# Patient Record
Sex: MALE | NOT HISPANIC OR LATINO | Employment: OTHER | URBAN - METROPOLITAN AREA
[De-identification: names, ages, dates, MRNs, and addresses within clinical notes are randomized per-mention and may not be internally consistent; named-entity substitution may affect disease eponyms.]

---

## 2018-08-08 ENCOUNTER — HOSPITAL ENCOUNTER (EMERGENCY)
Facility: HOSPITAL | Age: 53
Discharge: HOME | End: 2018-08-08
Attending: EMERGENCY MEDICINE
Payer: OTHER GOVERNMENT

## 2018-08-08 VITALS
WEIGHT: 184.75 LBS | TEMPERATURE: 98 F | HEART RATE: 73 BPM | SYSTOLIC BLOOD PRESSURE: 147 MMHG | BODY MASS INDEX: 27.28 KG/M2 | OXYGEN SATURATION: 98 % | RESPIRATION RATE: 16 BRPM | DIASTOLIC BLOOD PRESSURE: 80 MMHG

## 2018-08-08 DIAGNOSIS — R74.8 ELEVATED LIVER ENZYMES: ICD-10-CM

## 2018-08-08 DIAGNOSIS — K80.20 CALCULUS OF GALLBLADDER WITHOUT CHOLECYSTITIS WITHOUT OBSTRUCTION: ICD-10-CM

## 2018-08-08 DIAGNOSIS — R10.10 PAIN OF UPPER ABDOMEN: Primary | ICD-10-CM

## 2018-08-08 PROBLEM — R10.84 GENERALIZED ABDOMINAL PAIN: Status: ACTIVE | Noted: 2018-08-08

## 2018-08-08 PROBLEM — R14.0 ABDOMINAL DISTENTION: Status: ACTIVE | Noted: 2018-08-08

## 2018-08-08 LAB
ALBUMIN SERPL BCP-MCNC: 4.2 G/DL
ALP SERPL-CCNC: 67 U/L
ALT SERPL W/O P-5'-P-CCNC: 56 U/L
ANION GAP SERPL CALC-SCNC: 12 MMOL/L
AST SERPL-CCNC: 115 U/L
BASOPHILS # BLD AUTO: 0.03 K/UL
BASOPHILS NFR BLD: 0.7 %
BILIRUB SERPL-MCNC: 1.9 MG/DL
BILIRUB UR QL STRIP: ABNORMAL
BUN SERPL-MCNC: 19 MG/DL
CALCIUM SERPL-MCNC: 10.2 MG/DL
CHLORIDE SERPL-SCNC: 105 MMOL/L
CLARITY UR: CLEAR
CO2 SERPL-SCNC: 22 MMOL/L
COLOR UR: YELLOW
CREAT SERPL-MCNC: 1.1 MG/DL
DIFFERENTIAL METHOD: ABNORMAL
EOSINOPHIL # BLD AUTO: 0.1 K/UL
EOSINOPHIL NFR BLD: 1.6 %
ERYTHROCYTE [DISTWIDTH] IN BLOOD BY AUTOMATED COUNT: 13.5 %
EST. GFR  (AFRICAN AMERICAN): >60 ML/MIN/1.73 M^2
EST. GFR  (NON AFRICAN AMERICAN): >60 ML/MIN/1.73 M^2
GLUCOSE SERPL-MCNC: 104 MG/DL
GLUCOSE UR QL STRIP: NEGATIVE
HCT VFR BLD AUTO: 37.2 %
HGB BLD-MCNC: 12.9 G/DL
HGB UR QL STRIP: NEGATIVE
KETONES UR QL STRIP: ABNORMAL
LEUKOCYTE ESTERASE UR QL STRIP: NEGATIVE
LIPASE SERPL-CCNC: 67 U/L
LYMPHOCYTES # BLD AUTO: 1 K/UL
LYMPHOCYTES NFR BLD: 23 %
MCH RBC QN AUTO: 33.6 PG
MCHC RBC AUTO-ENTMCNC: 34.7 G/DL
MCV RBC AUTO: 97 FL
MONOCYTES # BLD AUTO: 0.6 K/UL
MONOCYTES NFR BLD: 14.2 %
NEUTROPHILS # BLD AUTO: 2.7 K/UL
NEUTROPHILS NFR BLD: 60.5 %
NITRITE UR QL STRIP: NEGATIVE
PH UR STRIP: 7 [PH] (ref 5–8)
PLATELET # BLD AUTO: 86 K/UL
PMV BLD AUTO: 9.5 FL
POTASSIUM SERPL-SCNC: 4.2 MMOL/L
PROT SERPL-MCNC: 8.4 G/DL
PROT UR QL STRIP: NEGATIVE
RBC # BLD AUTO: 3.84 M/UL
SODIUM SERPL-SCNC: 139 MMOL/L
SP GR UR STRIP: 1.01 (ref 1–1.03)
URN SPEC COLLECT METH UR: ABNORMAL
UROBILINOGEN UR STRIP-ACNC: ABNORMAL EU/DL
WBC # BLD AUTO: 4.44 K/UL

## 2018-08-08 PROCEDURE — 96375 TX/PRO/DX INJ NEW DRUG ADDON: CPT

## 2018-08-08 PROCEDURE — 99284 EMERGENCY DEPT VISIT MOD MDM: CPT | Mod: 25

## 2018-08-08 PROCEDURE — 25500020 PHARM REV CODE 255: Performed by: EMERGENCY MEDICINE

## 2018-08-08 PROCEDURE — 96361 HYDRATE IV INFUSION ADD-ON: CPT

## 2018-08-08 PROCEDURE — 85025 COMPLETE CBC W/AUTO DIFF WBC: CPT

## 2018-08-08 PROCEDURE — 25000003 PHARM REV CODE 250: Performed by: EMERGENCY MEDICINE

## 2018-08-08 PROCEDURE — 81003 URINALYSIS AUTO W/O SCOPE: CPT

## 2018-08-08 PROCEDURE — 93010 ELECTROCARDIOGRAM REPORT: CPT | Mod: ,,, | Performed by: INTERNAL MEDICINE

## 2018-08-08 PROCEDURE — C9113 INJ PANTOPRAZOLE SODIUM, VIA: HCPCS | Performed by: EMERGENCY MEDICINE

## 2018-08-08 PROCEDURE — 63600175 PHARM REV CODE 636 W HCPCS: Performed by: EMERGENCY MEDICINE

## 2018-08-08 PROCEDURE — 80053 COMPREHEN METABOLIC PANEL: CPT

## 2018-08-08 PROCEDURE — 86677 HELICOBACTER PYLORI ANTIBODY: CPT

## 2018-08-08 PROCEDURE — 83690 ASSAY OF LIPASE: CPT

## 2018-08-08 PROCEDURE — 96374 THER/PROPH/DIAG INJ IV PUSH: CPT | Mod: 59

## 2018-08-08 RX ORDER — ONDANSETRON 4 MG/1
4 TABLET, ORALLY DISINTEGRATING ORAL
Status: COMPLETED | OUTPATIENT
Start: 2018-08-08 | End: 2018-08-08

## 2018-08-08 RX ORDER — NAPROXEN 500 MG/1
500 TABLET ORAL 2 TIMES DAILY
COMMUNITY
End: 2018-08-09 | Stop reason: DRUGHIGH

## 2018-08-08 RX ORDER — MORPHINE SULFATE 4 MG/ML
4 INJECTION, SOLUTION INTRAMUSCULAR; INTRAVENOUS
Status: COMPLETED | OUTPATIENT
Start: 2018-08-08 | End: 2018-08-08

## 2018-08-08 RX ORDER — HYDROCODONE BITARTRATE AND ACETAMINOPHEN 5; 325 MG/1; MG/1
1 TABLET ORAL
Status: COMPLETED | OUTPATIENT
Start: 2018-08-08 | End: 2018-08-08

## 2018-08-08 RX ORDER — ONDANSETRON 2 MG/ML
4 INJECTION INTRAMUSCULAR; INTRAVENOUS
Status: COMPLETED | OUTPATIENT
Start: 2018-08-08 | End: 2018-08-08

## 2018-08-08 RX ORDER — ONDANSETRON 4 MG/1
4 TABLET, FILM COATED ORAL EVERY 6 HOURS
Qty: 12 TABLET | Refills: 0 | Status: SHIPPED | OUTPATIENT
Start: 2018-08-08

## 2018-08-08 RX ADMIN — DICYCLOMINE HYDROCHLORIDE 50 ML: 10 SOLUTION ORAL at 03:08

## 2018-08-08 RX ADMIN — MORPHINE SULFATE 4 MG: 4 INJECTION INTRAVENOUS at 11:08

## 2018-08-08 RX ADMIN — ONDANSETRON 4 MG: 2 INJECTION, SOLUTION INTRAMUSCULAR; INTRAVENOUS at 11:08

## 2018-08-08 RX ADMIN — IOHEXOL 30 ML: 350 INJECTION, SOLUTION INTRAVENOUS at 11:08

## 2018-08-08 RX ADMIN — HYDROCODONE BITARTRATE AND ACETAMINOPHEN 1 TABLET: 5; 325 TABLET ORAL at 05:08

## 2018-08-08 RX ADMIN — ONDANSETRON 4 MG: 4 TABLET, ORALLY DISINTEGRATING ORAL at 05:08

## 2018-08-08 RX ADMIN — DEXTROSE 40 MG: 50 INJECTION, SOLUTION INTRAVENOUS at 03:08

## 2018-08-08 RX ADMIN — SODIUM CHLORIDE 1000 ML: 0.9 INJECTION, SOLUTION INTRAVENOUS at 11:08

## 2018-08-08 RX ADMIN — IOHEXOL 75 ML: 350 INJECTION, SOLUTION INTRAVENOUS at 01:08

## 2018-08-08 NOTE — DISCHARGE INSTRUCTIONS
You can also try FD Guard over the counter for your abdominal pain.  Take 1 capsule daily.  Return for fever, vomiting, worsening pain or for any concerns at all.

## 2018-08-08 NOTE — HPI
Patient presents to the emergency room with abdominal pain is been going on since January.  He describes the pain as a crampy pain associated with upper mid and lower abdominal discomfort.  The pain can occur after eating.    The patient states that whenever he goes to sleep for the night or takes a nap he wakes up with abdominal distention and bloating.  If he gets up and moves around it gets better.  There is pain associated with this.    The pain radiates to the bilateral flanks.  He does report nausea and vomiting.  There is no constipation The pain can occur postprandially.    The patient has had imaging studies done in the past that did not show cholelithiasis however [facility] and his CT scan shows a 7 mm gallstone.    His liver function tests were slightly elevated however they are markedly improved to the liver function test approximately a year ago.  At that time he was drinking 10-12 beers a day.  He has cut down to 3 or 4 on the weekends.  He has had less over the last 2 weeks due to the discomfort.    The patient states that he drank the bowel prep he was given 1st colonoscopy and this did not improve symptoms    [facility] and he states that he is scheduled to undergo a colonoscopy and upper endoscopy next week.  He came to the hospital because of the pain.  The patient is very frustrated as to the etiology of the pain. He would like something to be done to figure out why the pain is occurring and its etiology.  He says that if his sent home he will just come back tomorrow because of the pain.

## 2018-08-08 NOTE — HOSPITAL COURSE
Surgery was called to see the patient secondary to his elevated liver function test and the fact that a gallstone of 7 mm size was seen on the CT scan.    In review of the Care everywhere section the patient's liver function tests were significantly more elevated approximately a year ago.  A liver biopsy was consistent with fatty infiltration of the liver.

## 2018-08-08 NOTE — CONSULTS
[facility] -   General Surgery  Consult Note    Patient Name: Georges Alatorre  MRN: [id number]  Code Status: No Order  Admission Date: 8/8/2018  Hospital Length of Stay: 0 days  Attending Physician: Huma Zafar Do, MD  Primary Care Provider: Yuliana Monge MD    Patient information was obtained from parent, past medical records and ER records.     Consults  Subjective:     Principal Problem: <principal problem not specified>    History of Present Illness: Patient presents to the emergency room with abdominal pain is been going on since January.  He describes the pain as a crampy pain associated with upper mid and lower abdominal discomfort.  The pain can occur after eating.    The patient states that whenever he goes to sleep for the night or takes a nap he wakes up with abdominal distention and bloating.  If he gets up and moves around it gets better.  There is pain associated with this.    The pain radiates to the bilateral flanks.  He does report nausea and vomiting.  There is no constipation The pain can occur postprandially.    The patient has had imaging studies done in the past that did not show cholelithiasis however [facility] and his CT scan shows a 7 mm gallstone.    His liver function tests were slightly elevated however they are markedly improved to the liver function test approximately a year ago.  At that time he was drinking 10-12 beers a day.  He has cut down to 3 or 4 on the weekends.  He has had less over the last 2 weeks due to the discomfort.    The patient states that he drank the bowel prep he was given 1st colonoscopy and this did not improve symptoms    [facility] and he states that he is scheduled to undergo a colonoscopy and upper endoscopy next week.  He came to the hospital because of the pain.  The patient is very frustrated as to the etiology of the pain. He would like something to be done to figure out why the pain is  occurring and its etiology.  He says that if his sent home he will just come back tomorrow because of the pain.    No current facility-administered medications on file prior to encounter.      Current Outpatient Prescriptions on File Prior to Encounter   Medication Sig    omeprazole (PRILOSEC) 40 MG capsule Take 40 mg by mouth once daily.    hydrocodone-acetaminophen 7.5-325mg (NORCO) 7.5-325 mg per tablet Take 1 tablet by mouth every 6 (six) hours as needed for Pain.    lorazepam (ATIVAN) 1 MG tablet Take 0.5 tablets (0.5 mg total) by mouth every 6 (six) hours as needed for Anxiety.    valsartan (DIOVAN) 160 MG tablet Take 160 mg by mouth once daily.       Review of patient's allergies indicates:  No Known Allergies    Past Medical History:   Diagnosis Date    Alcohol dependence     Chronic pain     DDD (degenerative disc disease)     Depression     Elevated LFTs     HTN (hypertension)     Hyperlipidemia     PTSD (post-traumatic stress disorder)      Past Surgical History:   Procedure Laterality Date    APPENDECTOMY      EAR RECONSTRUCTION Right     HEMORRHOID SURGERY      HERNIA REPAIR Right     LEG SURGERY Left     fx repair     Family History     None        Social History Main Topics    Smoking status: Former Smoker    Smokeless tobacco: Not on file    Alcohol use Yes      Comment: He was drinking 12 beers/night, but now down to 2 during the week. Mostly weekend use at this tiem.     Drug use: No    Sexual activity: Not on file     Review of Systems   Constitutional: Negative.    HENT: Negative.    Eyes: Negative.    Respiratory: Negative.    Cardiovascular: Negative.    Gastrointestinal: Positive for abdominal distention and abdominal pain.   Endocrine: Negative.    Genitourinary: Negative.    Musculoskeletal: Negative.    Skin: Negative.    Allergic/Immunologic: Negative.    Neurological: Negative.    Hematological: Negative.    Psychiatric/Behavioral: Negative.      Objective:     Vital  Signs (Most Recent):  Temp: 98.3 °F (36.8 °C) (08/08/18 1042)  Pulse: 71 (08/08/18 1347)  Resp: 20 (08/08/18 1345)  BP: (!) 165/86 (08/08/18 1347)  SpO2: 99 % (08/08/18 1347) Vital Signs (24h Range):  Temp:  [98.3 °F (36.8 °C)] 98.3 °F (36.8 °C)  Pulse:  [68-89] 71  Resp:  [12-20] 20  SpO2:  [98 %-100 %] 99 %  BP: (141-165)/(82-92) 165/86     Weight: 83.8 kg (184 lb 11.9 oz)  Body mass index is 27.28 kg/m².    Physical Exam   Constitutional: He is oriented to person, place, and time. He appears well-developed and well-nourished. No distress.   HENT:   Head: Normocephalic and atraumatic.   Eyes: Pupils are equal, round, and reactive to light. No scleral icterus.   Neck: Normal range of motion. Neck supple. No JVD present. No tracheal deviation present. No thyromegaly present.   Cardiovascular: Normal rate, regular rhythm and normal heart sounds.    Pulmonary/Chest: Effort normal and breath sounds normal.   Abdominal: Soft. Bowel sounds are normal. He exhibits no distension and no mass. There is no tenderness (Mild upper abdomen and epigastric tenderness). There is no rebound and no guarding.   Musculoskeletal: Normal range of motion.   Lymphadenopathy:     He has no cervical adenopathy.   Neurological: He is alert and oriented to person, place, and time.   Skin: Skin is warm and dry.   Psychiatric: He has a normal mood and affect. His behavior is normal. Judgment and thought content normal.       Significant Labs:  CBC:   Recent Labs  Lab 08/08/18  1120   WBC 4.44   RBC 3.84*   HGB 12.9*   HCT 37.2*   PLT 86*   MCV 97   MCH 33.6*   MCHC 34.7     BMP:   Recent Labs  Lab 08/08/18  1120         K 4.2      CO2 22*   BUN 19   CREATININE 1.1   CALCIUM 10.2     CMP:   Recent Labs  Lab 08/08/18  1120      CALCIUM 10.2   ALBUMIN 4.2   PROT 8.4      K 4.2   CO2 22*      BUN 19   CREATININE 1.1   ALKPHOS 67   ALT 56*   *   BILITOT 1.9*     LFTs:   Recent Labs  Lab 08/08/18  1120    ALT 56*   *   ALKPHOS 67   BILITOT 1.9*   PROT 8.4   ALBUMIN 4.2     Coagulation: No results for input(s): LABPROT, INR, APTT in the last 168 hours.    Significant Diagnostics:  CT: I have reviewed all pertinent results/findings within the past 24 hours and my personal findings are:        Reading Physician Reading Date Result Priority   KOREY Klein Sr., MD 8/8/2018    Narrative     EXAMINATION:  CT ABDOMEN PELVIS WITH CONTRAST    CLINICAL HISTORY:  Abd pain, fever, abscess suspected;    TECHNIQUE:  Standard abdomen and pelvis CT protocol with oral and IV contrast was performed.  75 mL of Omnipaque 350 contrast material was used for this examination.    FINDINGS:  Finding: The size of the heart is within normal limits.  There is no evidence of an acute pulmonary process.  There are several nodular densities in the lungs that appear to be focal dilatations of the pulmonary vessels.  There is no pneumothorax or pleural effusion.    The liver is enlarged.  It measures 22.3 cm in craniocaudal dimension.  There is a 7 mm stone in the dependent portion of the gallbladder.  The pancreas and adrenals are normal in appearance.  The spleen is enlarged.  It measures 16.0 cm in length.  There is a mild amount of bilateral perinephric stranding. The ureters and the urinary bladder are normal in appearance. The appendix is not visualized.  The rest of the gastrointestinal system is normal in appearance. There is no free fluid within the abdomen or pelvis. There is no pneumoperitoneum.  There was no abscess visualized.      Impression       1. There was no abscess visualized.  2. The liver is enlarged. It measures 22.3 cm in craniocaudal dimension.  3. The spleen is enlarged. It measures 16.0 cm in length.  4. There is a 7 mm stone in the dependent portion of the gallbladder.  5. There is no evidence of an acute pulmonary process. There are several nodular densities in the lungs that appear to be focal dilatations of  the pulmonary vessels.  All CT scans at this facility use dose modulation, iterative reconstruction, and/or weight base dosing when appropriate to reduce radiation dose when appropriate to reduce radiation dose to as low as reasonably achievable.      Electronically signed by: Guilherme Klein MD  Date: 08/08/2018  Time: 14:00         Assessment/Plan:     Elevated LFTs    His liver function tests are elevated but there are dramatically improved from the ones done almost a year ago.  These are likely related to chronic alcohol use          VTE Risk Mitigation     None        Given the constellation of symptoms, they do not seem to fit gallbladder disease, consider Gastroenterology consultation.    Cholecystectomy can be considered after GI evaluation however the patient would need to understand that removed was gallbladder may or may not alleviate his symptoms and complaints    Thank you for your consult. I will follow-up with patient. Please contact us if you have any additional questions.    Pepe Duran MD  General Surgery  [facility]

## 2018-08-08 NOTE — ASSESSMENT & PLAN NOTE
His liver function tests are elevated but there are dramatically improved from the ones done almost a year ago.  These are likely related to chronic alcohol use

## 2018-08-08 NOTE — SUBJECTIVE & OBJECTIVE
No current facility-administered medications on file prior to encounter.      Current Outpatient Prescriptions on File Prior to Encounter   Medication Sig    omeprazole (PRILOSEC) 40 MG capsule Take 40 mg by mouth once daily.    hydrocodone-acetaminophen 7.5-325mg (NORCO) 7.5-325 mg per tablet Take 1 tablet by mouth every 6 (six) hours as needed for Pain.    lorazepam (ATIVAN) 1 MG tablet Take 0.5 tablets (0.5 mg total) by mouth every 6 (six) hours as needed for Anxiety.    valsartan (DIOVAN) 160 MG tablet Take 160 mg by mouth once daily.       Review of patient's allergies indicates:  No Known Allergies    Past Medical History:   Diagnosis Date    Alcohol dependence     Chronic pain     DDD (degenerative disc disease)     Depression     Elevated LFTs     HTN (hypertension)     Hyperlipidemia     PTSD (post-traumatic stress disorder)      Past Surgical History:   Procedure Laterality Date    APPENDECTOMY      EAR RECONSTRUCTION Right     HEMORRHOID SURGERY      HERNIA REPAIR Right     LEG SURGERY Left     fx repair     Family History     None        Social History Main Topics    Smoking status: Former Smoker    Smokeless tobacco: Not on file    Alcohol use Yes      Comment: He was drinking 12 beers/night, but now down to 2 during the week. Mostly weekend use at this tiem.     Drug use: No    Sexual activity: Not on file     Review of Systems   Constitutional: Negative.    HENT: Negative.    Eyes: Negative.    Respiratory: Negative.    Cardiovascular: Negative.    Gastrointestinal: Positive for abdominal distention and abdominal pain.   Endocrine: Negative.    Genitourinary: Negative.    Musculoskeletal: Negative.    Skin: Negative.    Allergic/Immunologic: Negative.    Neurological: Negative.    Hematological: Negative.    Psychiatric/Behavioral: Negative.      Objective:     Vital Signs (Most Recent):  Temp: 98.3 °F (36.8 °C) (08/08/18 1042)  Pulse: 71 (08/08/18 1347)  Resp: 20 (08/08/18  1345)  BP: (!) 165/86 (08/08/18 1347)  SpO2: 99 % (08/08/18 1347) Vital Signs (24h Range):  Temp:  [98.3 °F (36.8 °C)] 98.3 °F (36.8 °C)  Pulse:  [68-89] 71  Resp:  [12-20] 20  SpO2:  [98 %-100 %] 99 %  BP: (141-165)/(82-92) 165/86     Weight: 83.8 kg (184 lb 11.9 oz)  Body mass index is 27.28 kg/m².    Physical Exam   Constitutional: He is oriented to person, place, and time. He appears well-developed and well-nourished. No distress.   HENT:   Head: Normocephalic and atraumatic.   Eyes: Pupils are equal, round, and reactive to light. No scleral icterus.   Neck: Normal range of motion. Neck supple. No JVD present. No tracheal deviation present. No thyromegaly present.   Cardiovascular: Normal rate, regular rhythm and normal heart sounds.    Pulmonary/Chest: Effort normal and breath sounds normal.   Abdominal: Soft. Bowel sounds are normal. He exhibits no distension and no mass. There is no tenderness (Mild upper abdomen and epigastric tenderness). There is no rebound and no guarding.   Musculoskeletal: Normal range of motion.   Lymphadenopathy:     He has no cervical adenopathy.   Neurological: He is alert and oriented to person, place, and time.   Skin: Skin is warm and dry.   Psychiatric: He has a normal mood and affect. His behavior is normal. Judgment and thought content normal.       Significant Labs:  CBC:   Recent Labs  Lab 08/08/18  1120   WBC 4.44   RBC 3.84*   HGB 12.9*   HCT 37.2*   PLT 86*   MCV 97   MCH 33.6*   MCHC 34.7     BMP:   Recent Labs  Lab 08/08/18  1120         K 4.2      CO2 22*   BUN 19   CREATININE 1.1   CALCIUM 10.2     CMP:   Recent Labs  Lab 08/08/18  1120      CALCIUM 10.2   ALBUMIN 4.2   PROT 8.4      K 4.2   CO2 22*      BUN 19   CREATININE 1.1   ALKPHOS 67   ALT 56*   *   BILITOT 1.9*     LFTs:   Recent Labs  Lab 08/08/18  1120   ALT 56*   *   ALKPHOS 67   BILITOT 1.9*   PROT 8.4   ALBUMIN 4.2     Coagulation: No results for  input(s): LABPROT, INR, APTT in the last 168 hours.    Significant Diagnostics:  CT: I have reviewed all pertinent results/findings within the past 24 hours and my personal findings are:        Reading Physician Reading Date Result Priority   KOREY Klein Sr., MD 8/8/2018    Narrative     EXAMINATION:  CT ABDOMEN PELVIS WITH CONTRAST    CLINICAL HISTORY:  Abd pain, fever, abscess suspected;    TECHNIQUE:  Standard abdomen and pelvis CT protocol with oral and IV contrast was performed.  75 mL of Omnipaque 350 contrast material was used for this examination.    FINDINGS:  Finding: The size of the heart is within normal limits.  There is no evidence of an acute pulmonary process.  There are several nodular densities in the lungs that appear to be focal dilatations of the pulmonary vessels.  There is no pneumothorax or pleural effusion.    The liver is enlarged.  It measures 22.3 cm in craniocaudal dimension.  There is a 7 mm stone in the dependent portion of the gallbladder.  The pancreas and adrenals are normal in appearance.  The spleen is enlarged.  It measures 16.0 cm in length.  There is a mild amount of bilateral perinephric stranding. The ureters and the urinary bladder are normal in appearance. The appendix is not visualized.  The rest of the gastrointestinal system is normal in appearance. There is no free fluid within the abdomen or pelvis. There is no pneumoperitoneum.  There was no abscess visualized.      Impression       1. There was no abscess visualized.  2. The liver is enlarged. It measures 22.3 cm in craniocaudal dimension.  3. The spleen is enlarged. It measures 16.0 cm in length.  4. There is a 7 mm stone in the dependent portion of the gallbladder.  5. There is no evidence of an acute pulmonary process. There are several nodular densities in the lungs that appear to be focal dilatations of the pulmonary vessels.  All CT scans at this facility use dose modulation, iterative reconstruction,  and/or weight base dosing when appropriate to reduce radiation dose when appropriate to reduce radiation dose to as low as reasonably achievable.      Electronically signed by: Guilherme Klein MD  Date: 08/08/2018  Time: 14:00

## 2018-08-08 NOTE — ASSESSMENT & PLAN NOTE
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 He has thrombocytopenia which is concerning for liver cirrhosis. Follow up with current providers.

## 2018-08-08 NOTE — ASSESSMENT & PLAN NOTE
The patient's pain is a chronic problem with extensive GI evaluation in the past. Labs, vitals and CT were reviewed without any indication for acute endoscopy. His abdominal exam is also benign. I believe he should keep his currently scheduled scopes next week. He can try a change in PPI from Omeprazole to Protonix. I also recommend a trial of FDgard. He may benefit from SSRI as an outpatient. I offered a clinic appt after his scopes to discuss his results and talk about other treatment options. He declines. This was discussed with Dr. Nova in the ER.

## 2018-08-08 NOTE — ED NOTES
Pt sitting in chair at BS.  States back is hurting too bad to stay in the bed.  Complains of RUQ pain radiating to back.

## 2018-08-08 NOTE — SUBJECTIVE & OBJECTIVE
Past Medical History:   Diagnosis Date    Alcohol dependence     Chronic pain     DDD (degenerative disc disease)     Depression     Elevated LFTs     HTN (hypertension)     Hyperlipidemia     PTSD (post-traumatic stress disorder)        Past Surgical History:   Procedure Laterality Date    APPENDECTOMY      EAR RECONSTRUCTION Right     HEMORRHOID SURGERY      HERNIA REPAIR Right     LEG SURGERY Left     fx repair       Review of patient's allergies indicates:  No Known Allergies  Family History     None        Social History Main Topics    Smoking status: Former Smoker    Smokeless tobacco: Not on file    Alcohol use Yes      Comment: He was drinking 12 beers/night, but now down to 2 during the week. Mostly weekend use at this tiem.     Drug use: No    Sexual activity: Not on file     Review of Systems   Constitutional: Negative for fatigue and fever.   HENT: Negative for hearing loss.    Eyes: Negative for visual disturbance.   Respiratory: Negative for cough and shortness of breath.    Cardiovascular: Negative for chest pain and palpitations.   Gastrointestinal:        As per HPI.   Genitourinary: Negative for difficulty urinating, dysuria, frequency and hematuria.   Musculoskeletal: Negative for arthralgias and back pain.   Skin: Negative for color change and rash.   Neurological: Negative for dizziness, seizures, syncope, weakness, numbness and headaches.   Hematological: Does not bruise/bleed easily.   Psychiatric/Behavioral: The patient is not nervous/anxious.      Objective:     Vital Signs (Most Recent):  Temp: 98.3 °F (36.8 °C) (08/08/18 1042)  Pulse: 71 (08/08/18 1347)  Resp: 20 (08/08/18 1345)  BP: (!) 165/86 (08/08/18 1347)  SpO2: 99 % (08/08/18 1347) Vital Signs (24h Range):  Temp:  [98.3 °F (36.8 °C)] 98.3 °F (36.8 °C)  Pulse:  [68-89] 71  Resp:  [12-20] 20  SpO2:  [98 %-100 %] 99 %  BP: (141-165)/(82-92) 165/86     Weight: 83.8 kg (184 lb 11.9 oz) (08/08/18 1042)  Body mass index is  27.28 kg/m².      Intake/Output Summary (Last 24 hours) at 08/08/18 1628  Last data filed at 08/08/18 1311   Gross per 24 hour   Intake             1000 ml   Output                0 ml   Net             1000 ml       Lines/Drains/Airways     Peripheral Intravenous Line                 Peripheral IV - Single Lumen 08/08/18 1120 Right Forearm less than 1 day                Physical Exam   Constitutional: He is oriented to person, place, and time. He appears well-developed and well-nourished.   HENT:   Head: Normocephalic and atraumatic.   Eyes: EOM are normal.   Neck: Normal range of motion. Neck supple.   Cardiovascular: Normal rate, regular rhythm and normal heart sounds.    No murmur heard.  Pulmonary/Chest: Effort normal and breath sounds normal. No respiratory distress. He has no wheezes.   Abdominal: Soft. Bowel sounds are normal. He exhibits no distension and no mass. There is no hepatomegaly. There is no tenderness.   Musculoskeletal: He exhibits no edema.   Neurological: He is alert and oriented to person, place, and time. No cranial nerve deficit. Gait normal.   Skin: Skin is warm and dry. No rash noted.   Psychiatric: He has a normal mood and affect.       Significant Labs:  CBC:   Recent Labs  Lab 08/08/18  1120   WBC 4.44   HGB 12.9*   HCT 37.2*   PLT 86*     CMP:   Recent Labs  Lab 08/08/18  1120      CALCIUM 10.2   ALBUMIN 4.2   PROT 8.4      K 4.2   CO2 22*      BUN 19   CREATININE 1.1   ALKPHOS 67   ALT 56*   *   BILITOT 1.9*     Coagulation: No results for input(s): PT, INR, APTT in the last 48 hours.    Significant Imaging:  Imaging results within the past 24 hours have been reviewed.

## 2018-08-08 NOTE — HPI
The patient presented to the ER for abdominal pain, nausea and vomiting. He said it started six months ago but a review of Care Everywhere shows this has been a problem for several years. He has seen outside providers and [facility] He has had scopes, GES and imaging in the past. A CT scan done today didn't show any acute GI problems. When I first saw him and asked about his pain, he pointed to his mid back. Then he said the pain also is in his mid abdomen, across the upper abdomen and in his groin. He is on Omeprazole bid without relief. [facility] and colonoscopy next week. The patient denies heartburn, dysphagia or hematemesis. He denies MJ use or narcotic use. He feels like what he eats and drinks stops in the upper abdomen and won't go down. No outlet obstruction seen on CT scan. Prior gastric emptying study was normal. He has tried Amitriptyline in the past but said it made him jittery. He took a bowel prep last night with good BMs but no improvement in his pain. Symptoms are not consistent with cholecystitis.

## 2018-08-08 NOTE — ED PROVIDER NOTES
"SCRIBE #1 NOTE: I, Corinne Mack, am scribing for, and in the presence of, Huma Zafar Do, MD. I have scribed the entire note.      History      Chief Complaint   Patient presents with    Abdominal Pain     onset 3 months ago, worse over the past 2 weeks; also c/o n/v for the past 1-2 weeks       Review of patient's allergies indicates:  No Known Allergies     HPI   HPI    8/8/2018, 11:10 AM   History obtained from the patient      History of Present Illness: Georges Alatorre is a 52 y.o. male patient who presents to the Emergency Department for dull, diffuse abd pain which onset in January 2018. [facility] CT [facility] done in January. Pt has an EGD/colonoscopy scheduled for 08/14/18, but did not feel he could handle the pain until then. Symptoms are intermittent and moderate in severity. Pt reports that he wakes up bloated, in pain in the mornings and is slightly improved with activity throughout the day, but has worsened pain when he lays down to go to sleep and after eating. Pt reports that this cycle occurs everyday. Associated sxs include subjective fever, chills, weight loss, N/V, CP, and SOB. Patient denies any diarrhea, constipation, flank pain, difficulty urinating, HA, dizziness, weakness, and all other sxs at this time. No prior Tx reported. Pt states he took his "prep" for his endoscope to "see if it would help" a few days ago with no relief. Pt will be receiving another prep in the mail before his procedure. No further complaints or concerns at this time.         Arrival mode: Personal vehicle    PCP: Yuliana Monge MD       Past Medical History:  Past Medical History:   Diagnosis Date    Alcohol dependence     Chronic pain     DDD (degenerative disc disease)     Depression     Elevated LFTs     HTN (hypertension)     Hyperlipidemia     PTSD (post-traumatic stress disorder)        Past Surgical History:  Past Surgical History:   Procedure " Laterality Date    APPENDECTOMY      EAR RECONSTRUCTION Right     HEMORRHOID SURGERY      HERNIA REPAIR Right     LEG SURGERY Left     fx repair         Family History:  Family History   Problem Relation Age of Onset    Colon cancer Neg Hx        Social History:  Social History     Social History Main Topics    Smoking status: Former Smoker    Smokeless tobacco: Not on file    Alcohol use Yes      Comment: He was drinking 12 beers/night, but now down to 2 during the week. Mostly weekend use at this tiem.     Drug use: No    Sexual activity: Not on file       ROS   Review of Systems   Constitutional: Positive for chills, fever and unexpected weight change (decreased).   Respiratory: Positive for shortness of breath. Negative for cough.    Cardiovascular: Positive for chest pain. Negative for leg swelling.   Gastrointestinal: Positive for abdominal pain, nausea and vomiting. Negative for constipation and diarrhea.   Genitourinary: Negative for difficulty urinating and flank pain.   Musculoskeletal: Negative for back pain, neck pain and neck stiffness.   Skin: Negative for rash and wound.   Neurological: Negative for dizziness, weakness, light-headedness, numbness and headaches.   All other systems reviewed and are negative.    Physical Exam      Initial Vitals [08/08/18 1042]   BP Pulse Resp Temp SpO2   (!) 150/92 89 16 98.3 °F (36.8 °C) 99 %      MAP       --          Physical Exam  Nursing Notes and Vital Signs Reviewed.  Constitutional: Patient is in no apparent distress. Well-developed and well-nourished.  Head: Atraumatic. Normocephalic.  Eyes: PERRL. EOM intact. Conjunctivae are not pale. No scleral icterus.  ENT: Mucous membranes are moist. Oropharynx is clear and symmetric.    Neck: Supple. Full ROM. No lymphadenopathy.  Cardiovascular: Regular rate. Regular rhythm. No murmurs, rubs, or gallops. Distal pulses are 2+ and symmetric.  Pulmonary/Chest: No respiratory distress. Clear to auscultation  bilaterally. No wheezing or rales.  Abdominal: Soft and non-distended.  There is mild, generalized tenderness throughout.  No rebound, guarding, or rigidity.   Musculoskeletal: Moves all extremities. No obvious deformities.   Skin: Warm and dry.  Neurological:  Alert, awake, and appropriate.  Normal speech.  No acute focal neurological deficits are appreciated.  Psychiatric: Normal affect. Good eye contact. Appropriate in content.    ED Course    Procedures  ED Vital Signs:  Vitals:    08/08/18 1042 08/08/18 1108 08/08/18 1131 08/08/18 1132   BP: (!) 150/92 (!) 141/82  (!) 148/88   Pulse: 89 78 88    Resp: 16      Temp: 98.3 °F (36.8 °C)      TempSrc: Oral      SpO2: 99% 99%  100%   Weight: 83.8 kg (184 lb 11.9 oz)       08/08/18 1136 08/08/18 1301 08/08/18 1345 08/08/18 1347   BP:  (!) 151/92  (!) 165/86   Pulse: 72 68 78 71   Resp: 16 12 20    Temp:       TempSrc:       SpO2: 100% 98% 100% 99%   Weight:           Abnormal Lab Results:  Labs Reviewed   CBC W/ AUTO DIFFERENTIAL - Abnormal; Notable for the following:        Result Value    RBC 3.84 (*)     Hemoglobin 12.9 (*)     Hematocrit 37.2 (*)     MCH 33.6 (*)     Platelets 86 (*)     All other components within normal limits   COMPREHENSIVE METABOLIC PANEL - Abnormal; Notable for the following:     CO2 22 (*)     Total Bilirubin 1.9 (*)      (*)     ALT 56 (*)     All other components within normal limits   LIPASE - Abnormal; Notable for the following:     Lipase 67 (*)     All other components within normal limits   URINALYSIS - Abnormal; Notable for the following:     Ketones, UA Trace (*)     Bilirubin (UA) 1+ (*)     Urobilinogen, UA 2.0-3.0 (*)     All other components within normal limits   H. PYLORI ANTIBODY, IGG        All Lab Results:  Results for orders placed or performed during the hospital encounter of 08/08/18   CBC W/ AUTO DIFFERENTIAL   Result Value Ref Range    WBC 4.44 3.90 - 12.70 K/uL    RBC 3.84 (L) 4.60 - 6.20 M/uL    Hemoglobin  12.9 (L) 14.0 - 18.0 g/dL    Hematocrit 37.2 (L) 40.0 - 54.0 %    MCV 97 82 - 98 fL    MCH 33.6 (H) 27.0 - 31.0 pg    MCHC 34.7 32.0 - 36.0 g/dL    RDW 13.5 11.5 - 14.5 %    Platelets 86 (L) 150 - 350 K/uL    MPV 9.5 9.2 - 12.9 fL    Gran # (ANC) 2.7 1.8 - 7.7 K/uL    Lymph # 1.0 1.0 - 4.8 K/uL    Mono # 0.6 0.3 - 1.0 K/uL    Eos # 0.1 0.0 - 0.5 K/uL    Baso # 0.03 0.00 - 0.20 K/uL    Gran% 60.5 38.0 - 73.0 %    Lymph% 23.0 18.0 - 48.0 %    Mono% 14.2 4.0 - 15.0 %    Eosinophil% 1.6 0.0 - 8.0 %    Basophil% 0.7 0.0 - 1.9 %    Differential Method Automated    Comp. Metabolic Panel   Result Value Ref Range    Sodium 139 136 - 145 mmol/L    Potassium 4.2 3.5 - 5.1 mmol/L    Chloride 105 95 - 110 mmol/L    CO2 22 (L) 23 - 29 mmol/L    Glucose 104 70 - 110 mg/dL    BUN, Bld 19 6 - 20 mg/dL    Creatinine 1.1 0.5 - 1.4 mg/dL    Calcium 10.2 8.7 - 10.5 mg/dL    Total Protein 8.4 6.0 - 8.4 g/dL    Albumin 4.2 3.5 - 5.2 g/dL    Total Bilirubin 1.9 (H) 0.1 - 1.0 mg/dL    Alkaline Phosphatase 67 55 - 135 U/L     (H) 10 - 40 U/L    ALT 56 (H) 10 - 44 U/L    Anion Gap 12 8 - 16 mmol/L    eGFR if African American >60 >60 mL/min/1.73 m^2    eGFR if non African American >60 >60 mL/min/1.73 m^2   Lipase   Result Value Ref Range    Lipase 67 (H) 4 - 60 U/L   Urinalysis - Clean Catch   Result Value Ref Range    Specimen UA Urine, Clean Catch     Color, UA Yellow Yellow, Straw, Belen    Appearance, UA Clear Clear    pH, UA 7.0 5.0 - 8.0    Specific Gravity, UA 1.010 1.005 - 1.030    Protein, UA Negative Negative    Glucose, UA Negative Negative    Ketones, UA Trace (A) Negative    Bilirubin (UA) 1+ (A) Negative    Occult Blood UA Negative Negative    Nitrite, UA Negative Negative    Urobilinogen, UA 2.0-3.0 (A) <2.0 EU/dL    Leukocytes, UA Negative Negative       Imaging Results:  Imaging Results          CT Abdomen Pelvis With Contrast (Final result)  Result time 08/08/18 14:00:16    Final result by KOREY Klein Sr., MD  (08/08/18 14:00:16)                 Impression:      1. There was no abscess visualized.  2. The liver is enlarged. It measures 22.3 cm in craniocaudal dimension.  3. The spleen is enlarged. It measures 16.0 cm in length.  4. There is a 7 mm stone in the dependent portion of the gallbladder.  5. There is no evidence of an acute pulmonary process. There are several nodular densities in the lungs that appear to be focal dilatations of the pulmonary vessels.  All CT scans at this facility use dose modulation, iterative reconstruction, and/or weight base dosing when appropriate to reduce radiation dose when appropriate to reduce radiation dose to as low as reasonably achievable.      Electronically signed by: Guilherme Klein MD  Date:    08/08/2018  Time:    14:00             Narrative:    EXAMINATION:  CT ABDOMEN PELVIS WITH CONTRAST    CLINICAL HISTORY:  Abd pain, fever, abscess suspected;    TECHNIQUE:  Standard abdomen and pelvis CT protocol with oral and IV contrast was performed.  75 mL of Omnipaque 350 contrast material was used for this examination.    FINDINGS:  Finding: The size of the heart is within normal limits.  There is no evidence of an acute pulmonary process.  There are several nodular densities in the lungs that appear to be focal dilatations of the pulmonary vessels.  There is no pneumothorax or pleural effusion.    The liver is enlarged.  It measures 22.3 cm in craniocaudal dimension.  There is a 7 mm stone in the dependent portion of the gallbladder.  The pancreas and adrenals are normal in appearance.  The spleen is enlarged.  It measures 16.0 cm in length.  There is a mild amount of bilateral perinephric stranding. The ureters and the urinary bladder are normal in appearance. The appendix is not visualized.  The rest of the gastrointestinal system is normal in appearance. There is no free fluid within the abdomen or pelvis. There is no pneumoperitoneum.  There was no abscess visualized.                                  The EKG was ordered, reviewed, and independently interpreted by the ED provider.  Interpretation time: 1130  Rate: 66 BPM  Rhythm: normal sinus rhythm  Interpretation: No STEMI.             The Emergency Provider reviewed the vital signs and test results, which are outlined above.    ED Discussion     2:23 PM: Re-evaluated the pt. Pt is resting comfortably and is in no acute distress. Informed pt of gallstone and plan to consult general surgery. [facility], but told him it was not big enough to operate on. Pt expresses his understanding of the current plan of care. Pt has no further questions or concerns at this time. Pt last ate 2 days ago.    2:28 PM: Dr. Nova discussed the pt's case with Dr. Duran (General Surgery) who will evaluate the pt at the bedside.     2:39 PM: Dr. Duran (General Surgery) is at the pt bedside.    2:51 PM: Dr. Nova discussed the pt's case with Dr. Duran (General Surgery) who recommends consulting GI and admission if GI wishes to do an EGD tomorrow.    2:55 PM: Re-evaluated pt. Pt is agreeable to admission, should he meet the requirements.    3:14 PM: Dr. Nova discussed the pt's case with Dr. Rich (Gastroenterology) who will call back with her recommendation.    4:04 PM: Dr. Nova discussed the pt's case with Burke Mckeon PA-C (Gastroenterology) who recommends starting the pt on FDgard and supplementing with OTC nausea medications. Pt should have his EGD done as scheduled next week and f/u with GI.    4:27 PM: Reassessed pt at this time. Pt is awake, alert, and in no distress. Discussed with pt all pertinent ED information and results. Discussed pt dx and plan of tx. Advised pt to avoid drinking EtOH until his EGD. Gave pt all f/u and return to the ED instructions. Pt should return to the ED for any new or worsening sxs such as CP, SOB, diarrhea, fatigue, and diaphoresis. Pt should f/u with GI after his EGD next week. All  "questions and concerns were addressed at this time. Pt expresses understanding of information and instructions, and is comfortable with plan to discharge. Pt is stable for discharge.    I discussed with patient and/or family/caretaker that evaluation in the ED does not suggest any emergent or life threatening medical conditions requiring immediate intervention beyond what was provided in the ED, and I believe patient is safe for discharge.  Regardless, an unremarkable evaluation in the ED does not preclude the development or presence of a serious of life threatening condition. As such, patient was instructed to return immediately for any worsening or change in current symptoms.    4:43 PM: Pt is actively vomiting at this time.    4:48 PM: Dr. Nova discussed the pt's case with Lauri Butts NP (T[facility]) ([facility]) who will discuss the pt's case with the Hospital Medicine attending and call back with his recommendation.    4:54 PM: Dr. Nova discussed the pt's case with ALVIN Payne ([facility]) who recommends discharging the pt.          ED Medication(s):  Medications   sodium chloride 0.9% bolus 1,000 mL (0 mLs Intravenous Stopped 8/8/18 1311)   morphine injection 4 mg (4 mg Intravenous Given 8/8/18 1133)   ondansetron injection 4 mg (4 mg Intravenous Given 8/8/18 1132)   omnipaque 350 iohexol 30 mL (30 mLs Oral Given 8/8/18 1150)   omnipaque 350 iohexol 75 mL (75 mLs Intravenous Given 8/8/18 1339)   pantoprazole 40 mg in dextrose 5 % 100 mL IVPB (over 15 minutes) (ready to mix system) (40 mg Intravenous Given 8/8/18 1540)   GI cocktail (mylanta 30 mL, lidocaine 2 % viscous 10 mL, dicyclomine 10 mL) 50 mL (50 mLs Oral Given 8/8/18 1540)       New Prescriptions    ONDANSETRON (ZOFRAN) 4 MG TABLET    Take 1 tablet (4 mg total) by mouth every 6 (six) hours.       Follow-up Information     Yuliana Monge MD In 2 days.    Specialty:  Family Medicine  Contact information:  [address] [place] " [place] [id number]  [phone]                     Medical Decision Making    Medical Decision Making:   Clinical Tests:   Lab Tests: Ordered and Reviewed  Radiological Study: Ordered and Reviewed  Medical Tests: Ordered and Reviewed           Scribe Attestation:   Scribe #1: I performed the above scribed service and the documentation accurately describes the services I performed. I attest to the accuracy of the note.    Attending:   Physician Attestation Statement for Scribe #1: I, Huma Zafar Do, MD, personally performed the services described in this documentation, as scribed by Corinne Mack, in my presence, and it is both accurate and complete.          Clinical Impression       ICD-10-CM ICD-9-CM   1. Pain of upper abdomen R10.10 789.09   2. Elevated liver enzymes R74.8 790.5   3. Calculus of gallbladder without cholecystitis without obstruction K80.20 574.20       Disposition:   Disposition: Discharged  Condition: Stable           Huma Zafar Do, MD  08/08/18 1936

## 2018-08-09 ENCOUNTER — HOSPITAL ENCOUNTER (EMERGENCY)
Facility: HOSPITAL | Age: 53
Discharge: HOME | End: 2018-08-09
Attending: EMERGENCY MEDICINE
Payer: OTHER GOVERNMENT

## 2018-08-09 VITALS
OXYGEN SATURATION: 97 % | HEART RATE: 69 BPM | TEMPERATURE: 99 F | WEIGHT: 188.5 LBS | DIASTOLIC BLOOD PRESSURE: 77 MMHG | RESPIRATION RATE: 16 BRPM | HEIGHT: 69 IN | BODY MASS INDEX: 27.92 KG/M2 | SYSTOLIC BLOOD PRESSURE: 115 MMHG

## 2018-08-09 DIAGNOSIS — R07.89 OTHER CHEST PAIN: Primary | ICD-10-CM

## 2018-08-09 DIAGNOSIS — R05.9 COUGH: ICD-10-CM

## 2018-08-09 LAB
ALBUMIN SERPL BCP-MCNC: 3.8 G/DL
ALP SERPL-CCNC: 57 U/L
ALT SERPL W/O P-5'-P-CCNC: 60 U/L
ANION GAP SERPL CALC-SCNC: 10 MMOL/L
AST SERPL-CCNC: 141 U/L
BASOPHILS # BLD AUTO: 0.01 K/UL
BASOPHILS NFR BLD: 0.3 %
BILIRUB SERPL-MCNC: 1.5 MG/DL
BNP SERPL-MCNC: 108 PG/ML
BUN SERPL-MCNC: 13 MG/DL
CALCIUM SERPL-MCNC: 9.8 MG/DL
CHLORIDE SERPL-SCNC: 111 MMOL/L
CK SERPL-CCNC: 62 U/L
CO2 SERPL-SCNC: 19 MMOL/L
CREAT SERPL-MCNC: 1.1 MG/DL
DIFFERENTIAL METHOD: ABNORMAL
EOSINOPHIL # BLD AUTO: 0.1 K/UL
EOSINOPHIL NFR BLD: 1.5 %
ERYTHROCYTE [DISTWIDTH] IN BLOOD BY AUTOMATED COUNT: 13.6 %
EST. GFR  (AFRICAN AMERICAN): >60 ML/MIN/1.73 M^2
EST. GFR  (NON AFRICAN AMERICAN): >60 ML/MIN/1.73 M^2
GLUCOSE SERPL-MCNC: 102 MG/DL
H PYLORI IGG SERPL QL IA: POSITIVE
HCT VFR BLD AUTO: 34.3 %
HGB BLD-MCNC: 11.6 G/DL
LYMPHOCYTES # BLD AUTO: 0.7 K/UL
LYMPHOCYTES NFR BLD: 20.1 %
MCH RBC QN AUTO: 32.9 PG
MCHC RBC AUTO-ENTMCNC: 33.8 G/DL
MCV RBC AUTO: 97 FL
MONOCYTES # BLD AUTO: 0.5 K/UL
MONOCYTES NFR BLD: 14.9 %
NEUTROPHILS # BLD AUTO: 2 K/UL
NEUTROPHILS NFR BLD: 63.2 %
PLATELET # BLD AUTO: 77 K/UL
PMV BLD AUTO: 9.3 FL
POTASSIUM SERPL-SCNC: 4.3 MMOL/L
PROT SERPL-MCNC: 7.3 G/DL
RBC # BLD AUTO: 3.53 M/UL
SODIUM SERPL-SCNC: 140 MMOL/L
TROPONIN I SERPL DL<=0.01 NG/ML-MCNC: <0.006 NG/ML
WBC # BLD AUTO: 3.23 K/UL

## 2018-08-09 PROCEDURE — 82550 ASSAY OF CK (CPK): CPT

## 2018-08-09 PROCEDURE — 85025 COMPLETE CBC W/AUTO DIFF WBC: CPT

## 2018-08-09 PROCEDURE — 36415 COLL VENOUS BLD VENIPUNCTURE: CPT

## 2018-08-09 PROCEDURE — 84484 ASSAY OF TROPONIN QUANT: CPT

## 2018-08-09 PROCEDURE — 93010 ELECTROCARDIOGRAM REPORT: CPT | Mod: ,,, | Performed by: INTERNAL MEDICINE

## 2018-08-09 PROCEDURE — 83880 ASSAY OF NATRIURETIC PEPTIDE: CPT

## 2018-08-09 PROCEDURE — 80053 COMPREHEN METABOLIC PANEL: CPT

## 2018-08-09 PROCEDURE — 99284 EMERGENCY DEPT VISIT MOD MDM: CPT | Mod: 25

## 2018-08-09 RX ORDER — PROMETHAZINE HYDROCHLORIDE AND DEXTROMETHORPHAN HYDROBROMIDE 6.25; 15 MG/5ML; MG/5ML
5 SYRUP ORAL EVERY 6 HOURS PRN
Qty: 120 ML | Refills: 0 | Status: SHIPPED | OUTPATIENT
Start: 2018-08-09 | End: 2018-08-19

## 2018-08-09 RX ORDER — SENNOSIDES 8.6 MG/1
1 TABLET ORAL DAILY
COMMUNITY

## 2018-08-09 RX ORDER — NAPROXEN 375 MG/1
375 TABLET ORAL 2 TIMES DAILY WITH MEALS
Qty: 30 TABLET | Refills: 0 | Status: SHIPPED | OUTPATIENT
Start: 2018-08-09

## 2018-08-09 RX ORDER — MULTIVITAMIN
1 TABLET ORAL DAILY
COMMUNITY

## 2018-08-09 NOTE — ED PROVIDER NOTES
SCRIBE #1 NOTE: I, Corinne Mack, am scribing for, and in the presence of, Abel Haynes MD. I have scribed the entire note.      History      Chief Complaint   Patient presents with    Chest Pain     seen here yesterday for same, no change in pain       Review of patient's allergies indicates:  No Known Allergies     HPI   HPI    8/9/2018, 8:24 AM   History obtained from the patient      History of Present Illness: Georges Alatorre is a 52 y.o. male patient with PMHx of HTN who presents to the Emergency Department for CP which onset yesterday. Symptoms are constant and moderate in severity. No mitigating or exacerbating factors reported. Associated sxs include N/V and SOB. Patient denies any fever, chills, diaphoresis, back pain, neck pain, HA, dizziness, and all other sxs at this time. No prior Tx reported. Pt was seen at this facility yesterday for chronic abd pain. Pt's work-up showed a large gallstone. After consulting with General Surgery, Gastroenterology, and Hospital Medicine the pt's gallstone was deemed non-emergent so the pt was not admitted or scheduled to be operated on. Pt was d/c home with FDgard, instructed to go to his scheduled EGD/colonoscopy on 08/14/18, and advised to f/u with GI after the procedure. No further complaints or concerns at this time.         Arrival mode: Personal vehicle    PCP: Yuliana Monge MD       Past Medical History:  Past Medical History:   Diagnosis Date    Alcohol dependence     Chronic pain     DDD (degenerative disc disease)     Depression     Elevated LFTs     HTN (hypertension)     Hyperlipidemia     PTSD (post-traumatic stress disorder)        Past Surgical History:  Past Surgical History:   Procedure Laterality Date    APPENDECTOMY      EAR RECONSTRUCTION Right     HEMORRHOID SURGERY      HERNIA REPAIR Right     LEG SURGERY Left     fx repair         Family History:  Family History   Problem Relation Age of Onset    Colon cancer Neg Hx         Social History:  Social History     Social History Main Topics    Smoking status: Former Smoker    Smokeless tobacco: Never Used    Alcohol use Yes      Comment: He was drinking 12 beers/night, but now down to 2 during the week. Mostly weekend use at this tiem.     Drug use: No    Sexual activity: Not on file       ROS   Review of Systems   Constitutional: Negative for chills, diaphoresis and fever.   Respiratory: Positive for shortness of breath. Negative for cough.    Cardiovascular: Positive for chest pain. Negative for leg swelling.   Gastrointestinal: Positive for nausea and vomiting. Negative for abdominal pain and diarrhea.   Musculoskeletal: Negative for back pain, neck pain and neck stiffness.   Skin: Negative for rash and wound.   Neurological: Negative for dizziness, light-headedness, numbness and headaches.   All other systems reviewed and are negative.    Physical Exam      Initial Vitals [08/09/18 0817]   BP Pulse Resp Temp SpO2   116/71 75 18 98.7 °F (37.1 °C) 100 %      MAP       --          Physical Exam  Nursing Notes and Vital Signs Reviewed.  Constitutional: Patient is in no apparent distress. Well-developed and well-nourished.  Head: Atraumatic. Normocephalic.  Eyes: PERRL. EOM intact. Conjunctivae are not pale. No scleral icterus.  ENT: Mucous membranes are moist. Oropharynx is clear and symmetric.    Neck: Supple. Full ROM. No lymphadenopathy.  Cardiovascular: Regular rate. Regular rhythm. No murmurs, rubs, or gallops. Distal pulses are 2+ and symmetric.  Pulmonary/Chest: No respiratory distress. Clear to auscultation bilaterally. No wheezing or rales.  Abdominal: Soft and non-distended.  There is no tenderness.  No rebound, guarding, or rigidity.   Musculoskeletal: Moves all extremities. No obvious deformities.   Skin: Warm and dry.  Neurological:  Alert, awake, and appropriate.  Normal speech.  No acute focal neurological deficits are appreciated.  Psychiatric: Anxious. Good eye  "contact. Appropriate in content.    ED Course    Procedures  ED Vital Signs:  Vitals:    08/09/18 0817 08/09/18 0824 08/09/18 0830 08/09/18 0833   BP: 116/71  111/73    Pulse: 75 75 72 77   Resp: 18  19    Temp: 98.7 °F (37.1 °C)      TempSrc: Oral      SpO2: 100%  100%    Weight: 85.5 kg (188 lb 8 oz)      Height: 5' 9" (1.753 m)          Abnormal Lab Results:  Labs Reviewed   CBC W/ AUTO DIFFERENTIAL - Abnormal; Notable for the following:        Result Value    WBC 3.23 (*)     RBC 3.53 (*)     Hemoglobin 11.6 (*)     Hematocrit 34.3 (*)     MCH 32.9 (*)     Platelets 77 (*)     Lymph # 0.7 (*)     All other components within normal limits   COMPREHENSIVE METABOLIC PANEL - Abnormal; Notable for the following:     Chloride 111 (*)     CO2 19 (*)     Total Bilirubin 1.5 (*)      (*)     ALT 60 (*)     All other components within normal limits   B-TYPE NATRIURETIC PEPTIDE - Abnormal; Notable for the following:      (*)     All other components within normal limits   CK   TROPONIN I   URINALYSIS, REFLEX TO URINE CULTURE        All Lab Results:  Results for orders placed or performed during the hospital encounter of 08/09/18   CBC auto differential   Result Value Ref Range    WBC 3.23 (L) 3.90 - 12.70 K/uL    RBC 3.53 (L) 4.60 - 6.20 M/uL    Hemoglobin 11.6 (L) 14.0 - 18.0 g/dL    Hematocrit 34.3 (L) 40.0 - 54.0 %    MCV 97 82 - 98 fL    MCH 32.9 (H) 27.0 - 31.0 pg    MCHC 33.8 32.0 - 36.0 g/dL    RDW 13.6 11.5 - 14.5 %    Platelets 77 (L) 150 - 350 K/uL    MPV 9.3 9.2 - 12.9 fL    Gran # (ANC) 2.0 1.8 - 7.7 K/uL    Lymph # 0.7 (L) 1.0 - 4.8 K/uL    Mono # 0.5 0.3 - 1.0 K/uL    Eos # 0.1 0.0 - 0.5 K/uL    Baso # 0.01 0.00 - 0.20 K/uL    Gran% 63.2 38.0 - 73.0 %    Lymph% 20.1 18.0 - 48.0 %    Mono% 14.9 4.0 - 15.0 %    Eosinophil% 1.5 0.0 - 8.0 %    Basophil% 0.3 0.0 - 1.9 %    Differential Method Automated    Comprehensive metabolic panel   Result Value Ref Range    Sodium 140 136 - 145 mmol/L    " Potassium 4.3 3.5 - 5.1 mmol/L    Chloride 111 (H) 95 - 110 mmol/L    CO2 19 (L) 23 - 29 mmol/L    Glucose 102 70 - 110 mg/dL    BUN, Bld 13 6 - 20 mg/dL    Creatinine 1.1 0.5 - 1.4 mg/dL    Calcium 9.8 8.7 - 10.5 mg/dL    Total Protein 7.3 6.0 - 8.4 g/dL    Albumin 3.8 3.5 - 5.2 g/dL    Total Bilirubin 1.5 (H) 0.1 - 1.0 mg/dL    Alkaline Phosphatase 57 55 - 135 U/L     (H) 10 - 40 U/L    ALT 60 (H) 10 - 44 U/L    Anion Gap 10 8 - 16 mmol/L    eGFR if African American >60 >60 mL/min/1.73 m^2    eGFR if non African American >60 >60 mL/min/1.73 m^2   Brain natriuretic peptide   Result Value Ref Range     (H) 0 - 99 pg/mL   CK   Result Value Ref Range    CPK 62 20 - 200 U/L   Troponin I   Result Value Ref Range    Troponin I <0.006 0.000 - 0.026 ng/mL       Imaging Results:  Imaging Results          X-Ray Chest PA And Lateral (Final result)  Result time 08/09/18 08:40:06    Final result by Fabrice Castellano Jr., MD (08/09/18 08:40:06)                 Impression:      No acute findings.      Electronically signed by: Fabrice Castellano MD  Date:    08/09/2018  Time:    08:40             Narrative:    EXAMINATION:  XR CHEST PA AND LATERAL    CLINICAL HISTORY:  chest pain;    COMPARISON:  No comparison studies are available.    FINDINGS:  Heart size is normal.  Lungs appear clear of active disease.  No infiltrates or effusions.  No suspicious mass.                                 The EKG was ordered, reviewed, and independently interpreted by the ED provider.  Interpretation time: 0819  Rate: 63 BPM  Rhythm: normal sinus rhythm  Interpretation: No STEMI.             The Emergency Provider reviewed the vital signs and test results, which are outlined above.    ED Discussion     9:19 AM: Reassessed pt at this time. Pt is awake, alert, and in no distress. Pt reports having a cough with yellow sputum for several weeks. Pt will be sent home with promethazine. Discussed with pt all pertinent ED information and  results. Discussed pt dx and plan of tx. Gave pt all f/u and return to the ED instructions. Pt should f/u with his PCP in 2 days. All questions and concerns were addressed at this time. Pt expresses understanding of information and instructions, and is comfortable with plan to discharge. Pt is stable for discharge.    I discussed with patient and/or family/caretaker that evaluation in the ED does not suggest any emergent or life threatening medical conditions requiring immediate intervention beyond what was provided in the ED, and I believe patient is safe for discharge.  Regardless, an unremarkable evaluation in the ED does not preclude the development or presence of a serious of life threatening condition. As such, patient was instructed to return immediately for any worsening or change in current symptoms.    I have discussed with patient and/or family/caretaker chest pain precautions, specifically to return for worsening chest pain, shortness of breath, fever, or any concern.  I have low suspicion for cardiopulmonary, vascular, infectious, respiratory, or other emergent medical condition based on my evaluation in the ED.      ED Medication(s):  Medications - No data to display    New Prescriptions    NAPROXEN (NAPROSYN) 375 MG TABLET    Take 1 tablet (375 mg total) by mouth 2 (two) times daily with meals.    PROMETHAZINE-DEXTROMETHORPHAN (PROMETHAZINE-DM) 6.25-15 MG/5 ML SYRP    Take 5 mLs by mouth every 6 (six) hours as needed.       Follow-up Information     Yuliana Monge MD In 2 days.    Specialty:  Family Medicine  Contact information:  [address]  [place] [id number] [phone]                     Medical Decision Making    Medical Decision Making:   Clinical Tests:   Lab Tests: Ordered and Reviewed  Radiological Study: Ordered and Reviewed  Medical Tests: Ordered and Reviewed           Scribe Attestation:   Scribe #1: I performed the above scribed service and the documentation accurately  describes the services I performed. I attest to the accuracy of the note.    Attending:   Physician Attestation Statement for Scribe #1: I, Abel Haynes MD, personally performed the services described in this documentation, as scribed by Corinne Mack, in my presence, and it is both accurate and complete.          Clinical Impression       ICD-10-CM ICD-9-CM   1. Other chest pain R07.89 786.59   2. Cough R05 786.2       Disposition:   Disposition: Discharged  Condition: Stable           Abel Haynes MD  08/09/18 0944

## 2018-08-09 NOTE — ED NOTES
Pt seen here yesterday with pain from top of shoulders to pelvis, Dx with gallstone, enlarged liver and spleen and instructed to continue with GI appt next week. States pain in chest has been going on for several days and is the same as it was yesterday but that his abdominal pain has resolved. Pt then states he thinks he has pneumonia because one of his friends has pneumonia and he feels the same way they do. Also states he is allergic to every anti-depressant because he doesn't like the way they make him feel.
